# Patient Record
Sex: MALE | Race: WHITE | NOT HISPANIC OR LATINO | Employment: FULL TIME | URBAN - NONMETROPOLITAN AREA
[De-identification: names, ages, dates, MRNs, and addresses within clinical notes are randomized per-mention and may not be internally consistent; named-entity substitution may affect disease eponyms.]

---

## 2022-09-05 ENCOUNTER — HOSPITAL ENCOUNTER (EMERGENCY)
Facility: HOSPITAL | Age: 47
Discharge: HOME | End: 2022-09-05
Attending: EMERGENCY MEDICINE
Payer: COMMERCIAL

## 2022-09-05 VITALS
RESPIRATION RATE: 16 BRPM | WEIGHT: 275.57 LBS | SYSTOLIC BLOOD PRESSURE: 136 MMHG | HEART RATE: 92 BPM | OXYGEN SATURATION: 97 % | DIASTOLIC BLOOD PRESSURE: 70 MMHG | TEMPERATURE: 97 F

## 2022-09-05 DIAGNOSIS — G51.0 BELL PALSY: Primary | ICD-10-CM

## 2022-09-05 DIAGNOSIS — R73.9 HYPERGLYCEMIA: ICD-10-CM

## 2022-09-05 LAB
ANION GAP SERPL CALCULATED.3IONS-SCNC: 10 MMOL/L (ref 4–13)
BASOPHILS # BLD AUTO: 0.04 THOUSANDS/ΜL (ref 0–0.1)
BASOPHILS NFR BLD AUTO: 0 % (ref 0–1)
BUN SERPL-MCNC: 12 MG/DL (ref 5–25)
CALCIUM SERPL-MCNC: 9.1 MG/DL (ref 8.3–10.1)
CHLORIDE SERPL-SCNC: 98 MMOL/L (ref 96–108)
CO2 SERPL-SCNC: 28 MMOL/L (ref 21–32)
CREAT SERPL-MCNC: 0.97 MG/DL (ref 0.6–1.3)
EOSINOPHIL # BLD AUTO: 0.12 THOUSAND/ΜL (ref 0–0.61)
EOSINOPHIL NFR BLD AUTO: 1 % (ref 0–6)
ERYTHROCYTE [DISTWIDTH] IN BLOOD BY AUTOMATED COUNT: 12.6 % (ref 11.6–15.1)
GFR SERPL CREATININE-BSD FRML MDRD: 92 ML/MIN/1.73SQ M
GLUCOSE SERPL-MCNC: 175 MG/DL (ref 65–140)
HCT VFR BLD AUTO: 44.3 % (ref 36.5–49.3)
HGB BLD-MCNC: 15.2 G/DL (ref 12–17)
IMM GRANULOCYTES # BLD AUTO: 0.04 THOUSAND/UL (ref 0–0.2)
IMM GRANULOCYTES NFR BLD AUTO: 0 % (ref 0–2)
LYMPHOCYTES # BLD AUTO: 3.34 THOUSANDS/ΜL (ref 0.6–4.47)
LYMPHOCYTES NFR BLD AUTO: 33 % (ref 14–44)
MCH RBC QN AUTO: 29.2 PG (ref 26.8–34.3)
MCHC RBC AUTO-ENTMCNC: 34.3 G/DL (ref 31.4–37.4)
MCV RBC AUTO: 85 FL (ref 82–98)
MONOCYTES # BLD AUTO: 0.72 THOUSAND/ΜL (ref 0.17–1.22)
MONOCYTES NFR BLD AUTO: 7 % (ref 4–12)
NEUTROPHILS # BLD AUTO: 5.73 THOUSANDS/ΜL (ref 1.85–7.62)
NEUTS SEG NFR BLD AUTO: 59 % (ref 43–75)
NRBC BLD AUTO-RTO: 0 /100 WBCS
PLATELET # BLD AUTO: 284 THOUSANDS/UL (ref 149–390)
PMV BLD AUTO: 9.5 FL (ref 8.9–12.7)
POTASSIUM SERPL-SCNC: 3.6 MMOL/L (ref 3.5–5.3)
RBC # BLD AUTO: 5.2 MILLION/UL (ref 3.88–5.62)
SODIUM SERPL-SCNC: 136 MMOL/L (ref 135–147)
WBC # BLD AUTO: 9.99 THOUSAND/UL (ref 4.31–10.16)

## 2022-09-05 PROCEDURE — 99284 EMERGENCY DEPT VISIT MOD MDM: CPT | Performed by: EMERGENCY MEDICINE

## 2022-09-05 PROCEDURE — 86618 LYME DISEASE ANTIBODY: CPT | Performed by: EMERGENCY MEDICINE

## 2022-09-05 PROCEDURE — 36415 COLL VENOUS BLD VENIPUNCTURE: CPT | Performed by: EMERGENCY MEDICINE

## 2022-09-05 PROCEDURE — 99284 EMERGENCY DEPT VISIT MOD MDM: CPT

## 2022-09-05 PROCEDURE — 85025 COMPLETE CBC W/AUTO DIFF WBC: CPT | Performed by: EMERGENCY MEDICINE

## 2022-09-05 PROCEDURE — 80048 BASIC METABOLIC PNL TOTAL CA: CPT | Performed by: EMERGENCY MEDICINE

## 2022-09-05 RX ORDER — DOXYCYCLINE HYCLATE 100 MG/1
100 CAPSULE ORAL ONCE
Status: COMPLETED | OUTPATIENT
Start: 2022-09-05 | End: 2022-09-05

## 2022-09-05 RX ORDER — DOXYCYCLINE HYCLATE 100 MG/1
100 CAPSULE ORAL EVERY 12 HOURS
Qty: 42 CAPSULE | Refills: 0 | Status: SHIPPED | OUTPATIENT
Start: 2022-09-05 | End: 2022-09-26

## 2022-09-05 RX ADMIN — DOXYCYCLINE 100 MG: 100 CAPSULE ORAL at 17:45

## 2022-09-05 NOTE — ED PROVIDER NOTES
History  Chief Complaint   Patient presents with    Headache     Patient has a headache since Saturday  Patient then started with left eye twitching yesterday  Patient now has left sided facial numbness and droop since about 10am      Facial Numbness     26-year-old male via EMS with spouse notes left occipital headache that began subacutely 2 days ago  Pain amenable to tylenol  He later noticed left facial weakness, tingling  No fever  No viral prodrome  No rash  No tick noticed  Also notes blood sugar was elevated and may have cut out      History provided by:  Patient  Headache  Location: Behind left ear  Quality:  Dull  Radiates to:  Does not radiate  Onset quality:  Gradual  Timing:  Constant  Progression:  Waxing and waning  Chronicity:  New  Relieved by:  Acetaminophen and NSAIDs  Associated symptoms: focal weakness, paresthesias, tingling and weakness    Associated symptoms: no abdominal pain, no blurred vision, no dizziness, no ear pain, no facial pain, no fever, no hearing loss, no loss of balance, no neck pain, no neck stiffness and no visual change        None       History reviewed  No pertinent past medical history  History reviewed  No pertinent surgical history  History reviewed  No pertinent family history  I have reviewed and agree with the history as documented  E-Cigarette/Vaping    E-Cigarette Use Never User      E-Cigarette/Vaping Substances     Social History     Tobacco Use    Smoking status: Never Smoker    Smokeless tobacco: Never Used   Vaping Use    Vaping Use: Never used   Substance Use Topics    Alcohol use: Not Currently    Drug use: Not Currently       Review of Systems   Constitutional: Negative for fever  HENT: Negative for ear pain and hearing loss  Eyes: Negative for blurred vision  Gastrointestinal: Negative for abdominal pain  Musculoskeletal: Negative for neck pain and neck stiffness     Neurological: Positive for focal weakness, weakness, headaches and paresthesias  Negative for dizziness and loss of balance  All other systems reviewed and are negative  Physical Exam  Physical Exam  Vitals and nursing note reviewed  Constitutional:       Comments: Pleasant, comfortable-appearing   HENT:      Head: Normocephalic and atraumatic  Mouth/Throat:      Mouth: Mucous membranes are moist       Pharynx: Oropharynx is clear  Eyes:      Conjunctiva/sclera: Conjunctivae normal       Pupils: Pupils are equal, round, and reactive to light  Cardiovascular:      Rate and Rhythm: Normal rate and regular rhythm  Heart sounds: Normal heart sounds  Pulmonary:      Effort: Pulmonary effort is normal       Breath sounds: Normal breath sounds  Abdominal:      General: Bowel sounds are normal  There is no distension  Palpations: Abdomen is soft  Tenderness: There is no abdominal tenderness  Musculoskeletal:         General: No deformity  Cervical back: Neck supple  Skin:     General: Skin is warm and dry  Neurological:      General: No focal deficit present  Mental Status: He is alert and oriented to person, place, and time  Cranial Nerves: No cranial nerve deficit  Coordination: Coordination normal       Comments: Left facial weakness, poor blink, blunted eyebrow raise / smile   Psychiatric:         Behavior: Behavior normal          Thought Content:  Thought content normal          Judgment: Judgment normal          Vital Signs  ED Triage Vitals [09/05/22 1728]   Temperature Pulse Respirations Blood Pressure SpO2   (!) 97 °F (36 1 °C) 104 18 163/87 99 %      Temp Source Heart Rate Source Patient Position - Orthostatic VS BP Location FiO2 (%)   Temporal Monitor Lying Right arm --      Pain Score       No Pain           Vitals:    09/05/22 1728   BP: 163/87   Pulse: 104   Patient Position - Orthostatic VS: Lying         Visual Acuity      ED Medications  Medications   doxycycline hyclate (VIBRAMYCIN) capsule 100 mg (100 mg Oral Given 9/5/22 1745)       Diagnostic Studies  Results Reviewed     Procedure Component Value Units Date/Time    CBC and differential [[id number]] Collected: 09/05/22 1745    Lab Status: Final result Specimen: Blood from Arm, Left Updated: 09/05/22 1751     WBC 9 99 Thousand/uL      RBC 5 20 Million/uL      Hemoglobin 15 2 g/dL      Hematocrit 44 3 %      MCV 85 fL      MCH 29 2 pg      MCHC 34 3 g/dL      RDW 12 6 %      MPV 9 5 fL      Platelets 834 Thousands/uL      nRBC 0 /100 WBCs      Neutrophils Relative 59 %      Immat GRANS % 0 %      Lymphocytes Relative 33 %      Monocytes Relative 7 %      Eosinophils Relative 1 %      Basophils Relative 0 %      Neutrophils Absolute 5 73 Thousands/µL      Immature Grans Absolute 0 04 Thousand/uL      Lymphocytes Absolute 3 34 Thousands/µL      Monocytes Absolute 0 72 Thousand/µL      Eosinophils Absolute 0 12 Thousand/µL      Basophils Absolute 0 04 Thousands/µL     Lyme Total Antibody Profile with reflex to [facility] [[id number]] Collected: 09/05/22 1745    Lab Status: In process Specimen: Blood from Arm, Left Updated: 09/05/22 7591    Basic metabolic panel [[id number]] Collected: 09/05/22 1745    Lab Status:  In process Specimen: Blood from Arm, Left Updated: 09/05/22 1749                 No orders to display              Procedures  Procedures         ED Course  ED Course as of 09/05/22 1806   Mon Sep 05, 2022   1803 No history of tick exposure with left facial nerve paralysis, voices good understanding of his treatment and close outpatient follow-up and will return immediately if worse or any new symptoms   1806 Glucose, Random(!): 175                                             MDM    Disposition  Final diagnoses:   Bell palsy   Hyperglycemia     Time reflects when diagnosis was documented in both MDM as applicable and the Disposition within this note     Time User Action Codes Description Comment    9/5/2022  5:42 PM Yoli Mabry Add [G51 0] Bell palsy     9/5/2022 5:46 PM Nathalie Gilmore Add [R73 9] Hyperglycemia       ED Disposition     ED Disposition   Discharge    Condition   Stable    Date/Time   Mon Sep 5, 2022  5:42 PM    Comment   Quin Gilmore discharge to home/self care  Follow-up Information     Follow up With Specialties Details Why Contact Info    Deandra Parker DO Family Medicine  hyperglycemia [address] [place] [id number] [phone]            Patient's Medications   Discharge Prescriptions    DOXYCYCLINE HYCLATE (VIBRAMYCIN) 100 MG CAPSULE    Take 1 capsule (100 mg total) by mouth every 12 (twelve) hours for 21 days       Start Date: 9/5/2022  End Date: 9/26/2022       Order Dose: 100 mg       Quantity: 42 capsule    Refills: 0       No discharge procedures on file      PDMP Review     None          ED Provider  Electronically Signed by           Felicity Brennan DO  09/05/22 1639

## 2022-09-05 NOTE — DISCHARGE INSTRUCTIONS
Lyme test pending  Tape eye shut at bedtime  Lubricating eye drops frequently  Return immediately if worse or any new symptoms  Tylenol 1000 mg every 6 hours as needed  and/or  Advil 400 mg every 6 hours as needed  May take both together    Please call to arrange neurology appointment as soon as possible:    [facility] call [address] Neu[facility] call [phone]

## 2022-09-06 LAB — B BURGDOR IGG+IGM SER-ACNC: <0.2 AI

## 2023-11-15 ENCOUNTER — HOSPITAL ENCOUNTER (EMERGENCY)
Facility: HOSPITAL | Age: 48
Discharge: HOME | End: 2023-11-16
Attending: EMERGENCY MEDICINE
Payer: COMMERCIAL

## 2023-11-15 ENCOUNTER — APPOINTMENT (EMERGENCY)
Dept: RADIOLOGY | Facility: HOSPITAL | Age: 48
End: 2023-11-15
Payer: COMMERCIAL

## 2023-11-15 VITALS
HEART RATE: 112 BPM | OXYGEN SATURATION: 98 % | WEIGHT: 268.74 LBS | RESPIRATION RATE: 18 BRPM | SYSTOLIC BLOOD PRESSURE: 179 MMHG | TEMPERATURE: 98.6 F | DIASTOLIC BLOOD PRESSURE: 92 MMHG

## 2023-11-15 DIAGNOSIS — S52.502A CLOSED FRACTURE OF LEFT DISTAL RADIUS: Primary | ICD-10-CM

## 2023-11-15 PROCEDURE — 99285 EMERGENCY DEPT VISIT HI MDM: CPT | Performed by: EMERGENCY MEDICINE

## 2023-11-15 PROCEDURE — 73110 X-RAY EXAM OF WRIST: CPT

## 2023-11-15 PROCEDURE — 25605 CLTX DST RDL FX/EPHYS SEP W/: CPT | Performed by: EMERGENCY MEDICINE

## 2023-11-15 PROCEDURE — 99283 EMERGENCY DEPT VISIT LOW MDM: CPT

## 2023-11-15 RX ORDER — ACETAMINOPHEN 325 MG/1
975 TABLET ORAL ONCE
Status: COMPLETED | OUTPATIENT
Start: 2023-11-15 | End: 2023-11-15

## 2023-11-15 RX ADMIN — ACETAMINOPHEN 975 MG: 325 TABLET, FILM COATED ORAL at 23:58

## 2023-11-15 NOTE — Clinical Note
Robert Torres was seen and treated in our emergency department on 11/15/2023. No use of the left hand and arm until released. Diagnosis:     [place]  . He may return on this date: If you have any questions or concerns, please don't hesitate to call.       Juan M Mode, DO    ______________________________           _______________          _______________  Hospital Representative                              Date                                Time

## 2023-11-15 NOTE — Clinical Note
Lisa Garcia was seen and treated in our emergency department on 11/15/2023. No use of the left hand and arm until released. Diagnosis:     [place]  . He may return on this date: If you have any questions or concerns, please don't hesitate to call.       Jung Allen, DO    ______________________________           _______________          _______________  Hospital Representative                              Date                                Time

## 2023-11-16 ENCOUNTER — APPOINTMENT (EMERGENCY)
Dept: RADIOLOGY | Facility: HOSPITAL | Age: 48
End: 2023-11-16
Payer: COMMERCIAL

## 2023-11-16 PROCEDURE — 73100 X-RAY EXAM OF WRIST: CPT

## 2023-11-16 RX ORDER — LIDOCAINE HYDROCHLORIDE AND EPINEPHRINE 10; 10 MG/ML; UG/ML
1 INJECTION, SOLUTION INFILTRATION; PERINEURAL ONCE
Status: COMPLETED | OUTPATIENT
Start: 2023-11-16 | End: 2023-11-16

## 2023-11-16 RX ADMIN — LIDOCAINE HYDROCHLORIDE,EPINEPHRINE BITARTRATE 1 ML: 10; .01 INJECTION, SOLUTION INFILTRATION; PERINEURAL at 00:13

## 2024-10-02 NOTE — ED PROVIDER NOTES
History  Chief Complaint   Patient presents with    Wrist Pain     Pt fell while at a fire call this evening. C/o left wrist pain      Patient is a 71-year-old male presents the emergency department due to injury to the left wrist he fell and landed on left wrist no other injury no strike on head or loss of consciousness no anticoagulants has pain and swelling in the left wrist.  No numbness or weakness. History provided by:  Patient  Wrist Pain  Location:  Left wrist  Severity:  Mild  Onset quality:  Sudden  Duration:  1 hour  Timing:  Constant  Progression:  Unchanged  Chronicity:  New  Associated symptoms: no abdominal pain, no chest pain, no congestion, no cough, no diarrhea, no ear pain, no fatigue, no fever, no headaches, no myalgias, no nausea, no rash, no rhinorrhea, no shortness of breath, no sore throat, no vomiting and no wheezing        None       History reviewed. No pertinent past medical history. Past Surgical History:   Procedure Laterality Date    WRIST SURGERY Right        History reviewed. No pertinent family history. I have reviewed and agree with the history as documented. E-Cigarette/Vaping    E-Cigarette Use Never User      E-Cigarette/Vaping Substances     Social History     Tobacco Use    Smoking status: Never    Smokeless tobacco: Never   Vaping Use    Vaping Use: Never used   Substance Use Topics    Alcohol use: Not Currently    Drug use: Not Currently       Review of Systems   Constitutional:  Negative for activity change, appetite change, chills, fatigue and fever. HENT:  Negative for congestion, ear pain, rhinorrhea and sore throat. Eyes:  Negative for discharge, redness and visual disturbance. Respiratory:  Negative for cough, chest tightness, shortness of breath and wheezing. Cardiovascular:  Negative for chest pain and palpitations. Gastrointestinal:  Negative for abdominal pain, constipation, diarrhea, nausea and vomiting.    Endocrine: Negative for polydipsia and polyuria. Genitourinary:  Negative for difficulty urinating, dysuria, frequency, hematuria and urgency. Musculoskeletal:  Negative for arthralgias and myalgias. Left wrist pain and swelling   Skin:  Negative for color change, pallor and rash. Neurological:  Negative for dizziness, weakness, light-headedness, numbness and headaches. Hematological:  Negative for adenopathy. Does not bruise/bleed easily. All other systems reviewed and are negative. Physical Exam  Physical Exam  Vitals and nursing note reviewed. Constitutional:       Appearance: He is well-developed. HENT:      Head: Normocephalic and atraumatic. Right Ear: External ear normal.      Left Ear: External ear normal.      Nose: Nose normal.   Eyes:      Conjunctiva/sclera: Conjunctivae normal.      Pupils: Pupils are equal, round, and reactive to light. Cardiovascular:      Rate and Rhythm: Normal rate and regular rhythm. Heart sounds: Normal heart sounds. Pulmonary:      Effort: Pulmonary effort is normal. No respiratory distress. Breath sounds: Normal breath sounds. No wheezing or rales. Chest:      Chest wall: No tenderness. Abdominal:      General: Bowel sounds are normal. There is no distension. Palpations: Abdomen is soft. Tenderness: There is no abdominal tenderness. There is no guarding. Musculoskeletal:         General: Normal range of motion. Left wrist: Swelling, tenderness and bony tenderness present. Normal range of motion. Normal pulse. Cervical back: Normal range of motion and neck supple. Skin:     General: Skin is warm and dry. Neurological:      Mental Status: He is alert and oriented to person, place, and time. Cranial Nerves: No cranial nerve deficit. Sensory: No sensory deficit.          Vital Signs  ED Triage Vitals   Temperature Pulse Respirations Blood Pressure SpO2   11/15/23 2339 11/15/23 2339 11/15/23 2339 11/15/23 2339 11/15/23 2339 98.6 °F (37 °C) (!) 112 18 (!) 179/92 98 %      Temp Source Heart Rate Source Patient Position - Orthostatic VS BP Location FiO2 (%)   11/15/23 2339 11/15/23 2339 11/15/23 2339 11/15/23 2339 --   Temporal Monitor Sitting Right arm       Pain Score       11/15/23 2358       5           Vitals:    11/15/23 2339   BP: (!) 179/92   Pulse: (!) 112   Patient Position - Orthostatic VS: Sitting         Visual Acuity      ED Medications  Medications   acetaminophen (TYLENOL) tablet 975 mg (975 mg Oral Given 11/15/23 2358)   lidocaine-epinephrine (XYLOCAINE/EPINEPHRINE) 1 %-1:100,000 injection 1 mL (1 mL Infiltration Given by Other 11/16/23 0013)       Diagnostic Studies  Results Reviewed       None                   XR wrist 3+ views LEFT   ED Interpretation by Maximus Vasquez DO (11/16 0005)   Comminuted impacted displaced intra-articular left distal radius fracture      XR wrist 2 vw left    (Results Pending)              Procedures  Orthopedic injury treatment    Date/Time: 11/16/2023 12:22 AM    Performed by: Maximus Vasquez DO  Authorized by: Maximus Vasquez DO  Universal Protocol:  Procedure performed by:  Consent: Verbal consent obtained.   Risks and benefits: risks, benefits and alternatives were discussed  Consent given by: patient  Timeout called at: 11/16/2023 12:22 AM.  Patient understanding: patient states understanding of the procedure being performed    Injury location:  Wrist  Location details:  Left wrist  Injury type:  Fracture  Fracture type: distal radius    Fracture type: distal radius    Neurovascular status: Neurovascularly intact    Distal perfusion: normal    Neurological function: normal    Range of motion: normal    Local anesthesia used?: Yes    Anesthesia:  Local infiltration  Local anesthetic:  Lidocaine 1% with epinephrine (hematoma block)  Anesthetic total (ml):  5  Manipulation performed?: Yes    Confirmation: Reduction confirmed by x-ray    Immobilization:  Splint  Splint type:  Volar short arm  Supplies used:  Cotton padding, elastic bandage and Ortho-Glass  Neurovascular status: Neurovascularly intact    Distal perfusion: normal    Neurological function: normal    Patient tolerance:  Patient tolerated the procedure well with no immediate complications   Minimal reduction achieved. Patient tolerated forceful manipulation without pain or issue after hematoma block. ED Course  ED Course as of 11/16/23 0030   Thu Nov 16, 2023   0024 Spoke with Dr. Lamine Lee orthopedics on-call reviewed case and findings in the emergency department. Recommends attempt at reduction in the ED okay if unable to reduce at all and advised splint and follow-up with orthopedic hand surgery preferably. SBIRT 20yo+      Flowsheet Row Most Recent Value   Initial Alcohol Screen: US AUDIT-C     1. How often do you have a drink containing alcohol? 0 Filed at: 11/15/2023 2341   2. How many drinks containing alcohol do you have on a typical day you are drinking? 0 Filed at: 11/15/2023 2341   3a. Male UNDER 65: How often do you have five or more drinks on one occasion? 0 Filed at: 11/15/2023 2341   Audit-C Score 0 Filed at: 11/15/2023 2341   VIRAL: How many times in the past year have you. .. Used an illegal drug or used a prescription medication for non-medical reasons? Never Filed at: 11/15/2023 2341                      Medical Decision Making  Patient is neurovascular intact distal to injury x-rays reveal a comminuted impacted displaced suspected intra-articular distal radius fracture. Case was discussed with on-call orthopedics recommended follow-up with hand surgery reduction attempted in the ED with minimal improvement placed in volar wrist splint advised supportive care and prompt follow-up with orthopedic hand surgery referral provided for further evaluation and treatment. return precautions and anticipatory guidance discussed.       Problems Addressed:  Closed fracture of left distal radius: acute illness or injury    Amount and/or Complexity of Data Reviewed  Radiology: ordered and independent interpretation performed. Decision-making details documented in ED Course. Risk  OTC drugs. Prescription drug management. Disposition  Final diagnoses:   Closed fracture of left distal radius - Comminuted impacted intra-articular displaced. Time reflects when diagnosis was documented in both MDM as applicable and the Disposition within this note       Time User Action Codes Description Comment    11/16/2023 12:10 AM Adalgisa Esquivel Add [S52.502A] Closed fracture of left distal radius     11/16/2023 12:10 AM Rich Wolff Modify [S52.502A] Closed fracture of left distal radius Comminuted impacted intra-articular displaced. 11/16/2023 12:24 AM Cliff Wolff Modify [S52.502A] Closed fracture of left distal radius Comminuted impacted intra-articular displaced. ED Disposition       ED Disposition   Discharge    Condition   Stable    Date/Time   Thu Nov 16, 2023 [address] [place] [address] discharge to home/self care. Follow-up Information       Follow up With Specialties Details Why Contact Info    Kashmir Garces MD Orthopedic Surgery, Hand Surgery Schedule an appointment as soon as possible for a visit in 3 days  [address] [place] [address]  [phone]              There are no discharge medications for this patient.           PDMP Review       None            ED Provider  Electronically Signed by             Juan M Moreira DO  11/16/23 0030 - - -